# Patient Record
Sex: FEMALE | Race: BLACK OR AFRICAN AMERICAN | NOT HISPANIC OR LATINO | Employment: UNEMPLOYED | URBAN - METROPOLITAN AREA
[De-identification: names, ages, dates, MRNs, and addresses within clinical notes are randomized per-mention and may not be internally consistent; named-entity substitution may affect disease eponyms.]

---

## 2018-08-13 ENCOUNTER — OFFICE VISIT (OUTPATIENT)
Dept: URGENT CARE | Facility: CLINIC | Age: 58
End: 2018-08-13
Payer: MEDICAID

## 2018-08-13 DIAGNOSIS — Z76.89 ESTABLISHING CARE WITH NEW DOCTOR, ENCOUNTER FOR: ICD-10-CM

## 2018-08-13 DIAGNOSIS — J32.4 CHRONIC PANSINUSITIS: Primary | ICD-10-CM

## 2018-08-13 PROCEDURE — 99203 OFFICE O/P NEW LOW 30 MIN: CPT | Mod: S$GLB,,, | Performed by: NURSE PRACTITIONER

## 2018-08-13 RX ORDER — AMOXICILLIN AND CLAVULANATE POTASSIUM 875; 125 MG/1; MG/1
1 TABLET, FILM COATED ORAL EVERY 12 HOURS
Qty: 20 TABLET | Refills: 0 | Status: SHIPPED | OUTPATIENT
Start: 2018-08-13 | End: 2018-08-13

## 2018-08-13 RX ORDER — SILDENAFIL CITRATE 20 MG/1
20 TABLET ORAL 3 TIMES DAILY
COMMUNITY

## 2018-08-13 RX ORDER — LOSARTAN POTASSIUM 25 MG/1
25 TABLET ORAL DAILY
COMMUNITY

## 2018-08-13 RX ORDER — METOPROLOL TARTRATE 25 MG/1
25 TABLET, FILM COATED ORAL 2 TIMES DAILY
COMMUNITY

## 2018-08-13 RX ORDER — FUROSEMIDE 40 MG/1
40 TABLET ORAL 2 TIMES DAILY
COMMUNITY

## 2018-08-13 RX ORDER — AMOXICILLIN AND CLAVULANATE POTASSIUM 875; 125 MG/1; MG/1
1 TABLET, FILM COATED ORAL EVERY 12 HOURS
Qty: 20 TABLET | Refills: 0 | Status: SHIPPED | OUTPATIENT
Start: 2018-08-13 | End: 2018-08-23

## 2018-08-14 NOTE — PROGRESS NOTES
Subjective:       Patient ID: Ashley Romo is a 58 y.o. female.    Vitals:  vitals were not taken for this visit.     Chief Complaint: Headache    Chronic sinus infections. No relief with Claritin and Flonase.      Headache    This is a recurrent problem. The current episode started 1 to 4 weeks ago (approx. week and a half ago). The problem occurs constantly. The problem has been gradually worsening. The pain is located in the parietal and frontal region. The pain radiates to the face, left neck and right neck. The pain quality is similar to prior headaches. The quality of the pain is described as pulsating, throbbing and squeezing. The pain is at a severity of 10/10. The pain is severe. Associated symptoms include sinus pressure. Pertinent negatives include no abdominal pain, coughing, ear pain, eye redness, fever, nausea or sore throat. Nothing aggravates the symptoms. She has tried acetaminophen for the symptoms. The treatment provided no relief. Her past medical history is significant for cancer and hypertension. There is no history of pseudotumor cerebri or recent head traumas.     Review of Systems   Constitution: Negative for chills, fever and malaise/fatigue.   HENT: Positive for congestion and sinus pressure. Negative for ear pain, hoarse voice and sore throat.    Eyes: Negative for discharge and redness.   Cardiovascular: Negative for chest pain, dyspnea on exertion and leg swelling.   Respiratory: Negative for cough, shortness of breath, sputum production and wheezing.    Musculoskeletal: Negative for myalgias.   Gastrointestinal: Negative for abdominal pain and nausea.   Neurological: Positive for headaches.   All other systems reviewed and are negative.      Objective:      Physical Exam   Constitutional: She is oriented to person, place, and time. She appears well-developed and well-nourished. She is cooperative.  Non-toxic appearance. She does not appear ill. No distress.   HENT:   Head:  Normocephalic and atraumatic.   Right Ear: Hearing, tympanic membrane, external ear and ear canal normal.   Left Ear: Hearing, tympanic membrane, external ear and ear canal normal.   Nose: Mucosal edema present. No rhinorrhea or nasal deformity. No epistaxis. Right sinus exhibits maxillary sinus tenderness and frontal sinus tenderness. Left sinus exhibits maxillary sinus tenderness and frontal sinus tenderness.   Mouth/Throat: Uvula is midline, oropharynx is clear and moist and mucous membranes are normal. No trismus in the jaw. Normal dentition. No uvula swelling. No posterior oropharyngeal erythema.   Eyes: Conjunctivae and lids are normal. Right eye exhibits no discharge. Left eye exhibits no discharge. No scleral icterus.   Sclera clear bilat   Neck: Trachea normal, normal range of motion, full passive range of motion without pain and phonation normal. Neck supple.   Cardiovascular: Normal rate, regular rhythm, normal heart sounds, intact distal pulses and normal pulses.   Pulmonary/Chest: Effort normal and breath sounds normal. No respiratory distress.   Abdominal: Soft. Normal appearance and bowel sounds are normal. She exhibits no distension, no pulsatile midline mass and no mass. There is no tenderness.   Musculoskeletal: Normal range of motion. She exhibits no edema or deformity.   Lymphadenopathy:        Head (right side): No submental, no submandibular, no tonsillar, no preauricular, no posterior auricular and no occipital adenopathy present.        Head (left side): No submental, no submandibular, no tonsillar, no preauricular, no posterior auricular and no occipital adenopathy present.   Neurological: She is alert and oriented to person, place, and time. She exhibits normal muscle tone. Coordination normal.   Skin: Skin is warm, dry and intact. She is not diaphoretic. No pallor.   Psychiatric: She has a normal mood and affect. Her speech is normal and behavior is normal. Judgment and thought content  normal. Cognition and memory are normal.   Nursing note and vitals reviewed.      Assessment:       1. Chronic pansinusitis    2. Establishing care with new doctor, encounter for        Plan:         Chronic pansinusitis    Establishing care with new doctor, encounter for  -     Ambulatory referral to Internal Medicine    Other orders  -     Discontinue: amoxicillin-clavulanate 875-125mg (AUGMENTIN) 875-125 mg per tablet; Take 1 tablet by mouth every 12 (twelve) hours. for 10 days  Dispense: 20 tablet; Refill: 0  -     amoxicillin-clavulanate 875-125mg (AUGMENTIN) 875-125 mg per tablet; Take 1 tablet by mouth every 12 (twelve) hours. for 10 days  Dispense: 20 tablet; Refill: 0

## 2018-08-14 NOTE — PATIENT INSTRUCTIONS
Acute Bacterial Rhinosinusitis (ABRS)    Acute bacterial rhinosinusitis (ABRS) is an infection of your nasal cavity and sinuses. Its caused by bacteria. Acute means that youve had symptoms for less than 12 weeks.  Understanding your sinuses  The nasal cavity is the large air-filled space behind your nose. The sinuses are a group of spaces formed by the bones of your face. They connect with your nasal cavity. ABRS causes the tissue lining these spaces to become inflamed. Mucus may not drain normally. This leads to facial pain and other symptoms.  What causes ABRS?  ABRS most often follows an upper respiratory infection caused by a virus. Bacteria then infect the lining of your nasal cavity and sinuses. But you can also get ABRS if you have:  · Nasal allergies  · Long-term nasal swelling and congestion not caused by allergies  · Blockage in the nose  Symptoms of ABRS  The symptoms of ABRS may be different for each person, and can include:  · Nasal congestion  · Runny nose  · Fluid draining from the nose down the throat (postnasal drip)  · Headache  · Cough  · Pain in the sinuses  · Thick, colored fluid from the nose (mucus)  · Fever  Diagnosing ABRS  ABRS may be diagnosed if youve had an upper respiratory infection like a cold and cough for longer than 10 to 14 days. Your health care provider will ask about your symptoms and your medical history. The provider will check your vital signs, including your temperature. Youll have a physical exam. The health care provider will check your ears, nose, and throat. You likely wont need any tests. If ABRS comes back, you may have a culture or other tests.  Treatment for ABRS  Treatment may include:  · Antibiotic medicine. This is for symptoms that last for at least 10 to 14 days.  · Nasal corticosteroid medicine. Drops or spray used in the nose can lessen swelling and congestion.  · Over-the-counter pain medicine. This is to lessen sinus pain and pressure.  · Nasal  decongestant medicine. Spray or drops may help to lessen congestion. Do not use them for more than a few days.  · Salt wash (saline irrigation). This can help to loosen mucus.  Possible complications of ABRS  ABRS may come back or become long-term (chronic).  In rare cases, ABRS may cause complications such as:   · Inflamed tissue around the brain and spinal cord (meningitis)  · Inflamed tissue around the eyes (orbital cellulitis)  · Inflamed bones around the sinuses (osteitis)  These problems may need to be treated in a hospital with intravenous (IV) antibiotic medicine or surgery.  When to call the health care provider  Call your health care provider if you have any of the following:  · Symptoms that dont get better, or get worse  · Symptoms that dont get better after 3 to 5 days on antibiotics  · Trouble seeing  · Swelling around your eyes  · Confusion or trouble staying awake   Date Last Reviewed: 3/3/2015  © 5189-0684 [employer]. [address] [place] [address]. All rights reserved. This information is not intended as a substitute for professional medical care. Always follow your healthcare professional's instructions.      Please arrange follow up with your primary medical clinic as soon as possible. You must understand that you've received an Urgent Care treatment only and that you may be released before all of your medical problems are known or treated. You, the patient, will arrange for follow up as instructed. If your symptoms worsen or fail to improve you should go to the Emergency Room.

## 2018-08-16 ENCOUNTER — TELEPHONE (OUTPATIENT)
Dept: URGENT CARE | Facility: CLINIC | Age: 58
End: 2018-08-16

## 2022-09-26 ENCOUNTER — TELEPHONE (OUTPATIENT)
Dept: NEUROSURGERY | Facility: CLINIC | Age: 62
End: 2022-09-26
Payer: MEDICAID

## 2022-09-26 NOTE — TELEPHONE ENCOUNTER
Attempted to call all 3 numbers in pt's chart, all say the number is not in service. Unable to leave voicemail to offer pt appt from referral.

## 2022-10-25 ENCOUNTER — TELEPHONE (OUTPATIENT)
Dept: NEUROLOGY | Facility: CLINIC | Age: 62
End: 2022-10-25
Payer: MEDICAID

## 2022-10-25 NOTE — TELEPHONE ENCOUNTER
----- Message from Dia Mckenna sent at 10/21/2022  3:18 PM CDT -----  Regarding: Appt  Contact: pt [phone]  Pt was referred to Neuro by Dr. Jeison Caro, referral is not in Epic. Pt stated it was faxed over about 1-2 months ago. Please call

## 2022-10-27 ENCOUNTER — TELEPHONE (OUTPATIENT)
Dept: NEUROSURGERY | Facility: CLINIC | Age: 62
End: 2022-10-27
Payer: MEDICAID

## 2022-10-27 NOTE — TELEPHONE ENCOUNTER
CB and SW pt, who is having balance and memory issues but is mainly concerned about balance. She is not looking for surgery at this time. I explained it seems like she needs to be seen by neurology and sent a message to their department on her behalf. I then advised she have Dr. Jeison Caro, who referred her to neurosciences, resend the referral. Pt happy with conversation and did not have any further questions at that time.

## 2022-10-31 ENCOUNTER — TELEPHONE (OUTPATIENT)
Dept: NEUROSURGERY | Facility: CLINIC | Age: 62
End: 2022-10-31
Payer: MEDICAID

## 2022-10-31 NOTE — TELEPHONE ENCOUNTER
SW pt, explained that we would like her to see Dr. Mariscal but we will need her MRI from [facility] to have a productive visit. Pt will work on getting images through her PCP and I will try to get them pushed thorough [facility]. Scheduled pt for appt with Dr. Mariscal 11/22/22. Pt happy with date and time, she did not have any further questions. Letter mailed.

## 2022-11-02 ENCOUNTER — TELEPHONE (OUTPATIENT)
Dept: NEUROLOGY | Facility: CLINIC | Age: 62
End: 2022-11-02
Payer: MEDICAID

## 2022-11-02 NOTE — TELEPHONE ENCOUNTER
----- Message from Yuni Maciel MA sent at 10/27/2022 11:03 AM CDT -----  Good Morning,    This patient had a referral from Dr. Jeison Caro that was sent to be scanned into pt's chart about 1 month ago. Pt has a history of meningioma but is not a surgical candidate at this time. She does have balance and memory issues from surgery in 2019 and would like to see neurology. Could you assist in scheduling this patient?    Thank you,  Giuliana

## 2022-11-18 ENCOUNTER — TELEPHONE (OUTPATIENT)
Dept: NEUROSURGERY | Facility: CLINIC | Age: 62
End: 2022-11-18
Payer: MEDICAID

## 2022-11-18 NOTE — TELEPHONE ENCOUNTER
SW pt, explained that unfortunately, Dr. Mariscal will need to be in emergency surgery on Tuesday 11/22/22. Therefore, he cannot be in clinic during her appointment time. Pt rescheduled to 11/29/22 at 11:00 am. I also notified patient that we received her MRI from outside facility. Pt happy with date and time, no further questions at this time. Letter mailed.

## 2022-11-29 ENCOUNTER — OFFICE VISIT (OUTPATIENT)
Dept: NEUROSURGERY | Facility: CLINIC | Age: 62
End: 2022-11-29
Payer: MEDICAID

## 2022-11-29 VITALS — DIASTOLIC BLOOD PRESSURE: 101 MMHG | HEART RATE: 72 BPM | SYSTOLIC BLOOD PRESSURE: 240 MMHG

## 2022-11-29 DIAGNOSIS — D32.0 MENINGIOMA, RECURRENT OF BRAIN: Primary | ICD-10-CM

## 2022-11-29 DIAGNOSIS — R27.0 ATAXIA: ICD-10-CM

## 2022-11-29 DIAGNOSIS — Z98.890 H/O CRANIOTOMY: ICD-10-CM

## 2022-11-29 DIAGNOSIS — G93.89 ENCEPHALOMALACIA: ICD-10-CM

## 2022-11-29 PROCEDURE — 1159F PR MEDICATION LIST DOCUMENTED IN MEDICAL RECORD: ICD-10-PCS | Mod: CPTII,,, | Performed by: NEUROLOGICAL SURGERY

## 2022-11-29 PROCEDURE — 4010F PR ACE/ARB THEARPY RXD/TAKEN: ICD-10-PCS | Mod: CPTII,,, | Performed by: NEUROLOGICAL SURGERY

## 2022-11-29 PROCEDURE — 99205 OFFICE O/P NEW HI 60 MIN: CPT | Mod: S$PBB,,, | Performed by: NEUROLOGICAL SURGERY

## 2022-11-29 PROCEDURE — 99999 PR PBB SHADOW E&M-EST. PATIENT-LVL IV: ICD-10-PCS | Mod: PBBFAC,,, | Performed by: NEUROLOGICAL SURGERY

## 2022-11-29 PROCEDURE — 3080F PR MOST RECENT DIASTOLIC BLOOD PRESSURE >= 90 MM HG: ICD-10-PCS | Mod: CPTII,,, | Performed by: NEUROLOGICAL SURGERY

## 2022-11-29 PROCEDURE — 99205 PR OFFICE/OUTPT VISIT, NEW, LEVL V, 60-74 MIN: ICD-10-PCS | Mod: S$PBB,,, | Performed by: NEUROLOGICAL SURGERY

## 2022-11-29 PROCEDURE — 1159F MED LIST DOCD IN RCRD: CPT | Mod: CPTII,,, | Performed by: NEUROLOGICAL SURGERY

## 2022-11-29 PROCEDURE — 3080F DIAST BP >= 90 MM HG: CPT | Mod: CPTII,,, | Performed by: NEUROLOGICAL SURGERY

## 2022-11-29 PROCEDURE — 3077F SYST BP >= 140 MM HG: CPT | Mod: CPTII,,, | Performed by: NEUROLOGICAL SURGERY

## 2022-11-29 PROCEDURE — 99214 OFFICE O/P EST MOD 30 MIN: CPT | Mod: PBBFAC | Performed by: NEUROLOGICAL SURGERY

## 2022-11-29 PROCEDURE — 4010F ACE/ARB THERAPY RXD/TAKEN: CPT | Mod: CPTII,,, | Performed by: NEUROLOGICAL SURGERY

## 2022-11-29 PROCEDURE — 1160F RVW MEDS BY RX/DR IN RCRD: CPT | Mod: CPTII,,, | Performed by: NEUROLOGICAL SURGERY

## 2022-11-29 PROCEDURE — 1160F PR REVIEW ALL MEDS BY PRESCRIBER/CLIN PHARMACIST DOCUMENTED: ICD-10-PCS | Mod: CPTII,,, | Performed by: NEUROLOGICAL SURGERY

## 2022-11-29 PROCEDURE — 99999 PR PBB SHADOW E&M-EST. PATIENT-LVL IV: CPT | Mod: PBBFAC,,, | Performed by: NEUROLOGICAL SURGERY

## 2022-11-29 PROCEDURE — 3077F PR MOST RECENT SYSTOLIC BLOOD PRESSURE >= 140 MM HG: ICD-10-PCS | Mod: CPTII,,, | Performed by: NEUROLOGICAL SURGERY

## 2022-11-29 RX ORDER — EMPAGLIFLOZIN 25 MG/1
25 TABLET, FILM COATED ORAL
COMMUNITY
Start: 2022-11-03

## 2022-11-29 RX ORDER — SITAGLIPTIN 100 MG/1
100 TABLET, FILM COATED ORAL
COMMUNITY
Start: 2022-11-10

## 2022-11-29 RX ORDER — UBIDECARENONE 75 MG
500 CAPSULE ORAL
COMMUNITY

## 2022-11-29 RX ORDER — CHOLECALCIFEROL (VITAMIN D3) 25 MCG
1000 TABLET ORAL
COMMUNITY

## 2022-11-29 RX ORDER — METFORMIN HYDROCHLORIDE 500 MG/1
500 TABLET, EXTENDED RELEASE ORAL 2 TIMES DAILY
COMMUNITY
Start: 2022-08-20

## 2022-11-29 RX ORDER — BUSPIRONE HYDROCHLORIDE 7.5 MG/1
7.5 TABLET ORAL 2 TIMES DAILY PRN
COMMUNITY
Start: 2022-10-14

## 2022-11-29 RX ORDER — CARVEDILOL 25 MG/1
25 TABLET ORAL 2 TIMES DAILY
COMMUNITY
Start: 2022-11-03

## 2022-11-29 RX ORDER — ASCORBIC ACID 500 MG
500 TABLET ORAL 2 TIMES DAILY
COMMUNITY

## 2022-11-29 RX ORDER — CALCIUM CITRATE/VITAMIN D3 200MG-6.25
TABLET ORAL
COMMUNITY
Start: 2022-09-15

## 2022-11-29 NOTE — PROGRESS NOTES
CHIEF COMPLAINT:  Prior left cerebellar meningioma resection at outside hospital in 2019; follow up for ataxia    HPI:  Ashley Romo is a 62F who underwent left retrosigmoid approach for resection of L CPA meningioma resection (85%) by Dr Arturo Whitaker at [facility] in 2019 and has been followed by Dr Leblanc. Pt underwent one session of radiation 4 months after surgery reportedly due to regrowth.  She is trying to get a motorized wheel chair because she us unable to walk and relies on wheel chair.  Dr Leblanc dx her with ataxia.  She has weakness on left side and has had several falls from her wheel chair.  She states that insurance will not cover the power wheel chair because they claim that ataxia will improve.    She has done PT and balance specific PT to no avail.    Review of patient's allergies indicates:   Allergen Reactions    Codeine Other (See Comments)     Abdominal pain^elevates blood pressure  Stabbing abdominal pain      Eggshell membrane Rash     Rash     Ibuprofen Other (See Comments)     Raises blood pressure.    Naproxen sodium Other (See Comments)     ^elevates blood pressure    Metformin Diarrhea     Regular release metformin    Milk containing products Other (See Comments)     aggravates asthma     Nsaids (non-steroidal anti-inflammatory drug) Other (See Comments)     Elevated BP       Past Medical History:   Diagnosis Date    Arthritis     Asthma     Cancer of kidney 2016    Diabetes mellitus, type 2     Hypertension     Overdose of illicit drug     PAH (pulmonary artery hypertension) 2013     Past Surgical History:   Procedure Laterality Date    CHOLECYSTECTOMY  1986    HYSTERECTOMY  2011    NEPHRECTOMY Left 11/2017    PARTIAL HYSTERECTOMY  12/2006    SINUS SURGERY  1981     History reviewed. No pertinent family history.  Social History     Tobacco Use    Smoking status: Former    Smokeless tobacco: Never   Substance Use Topics    Alcohol use: Yes     Comment: occationally        Review of  Systems   Constitutional:  Negative for chills, fever and weight loss.   HENT:  Negative for hearing loss and tinnitus.    Eyes:  Negative for blurred vision and photophobia.   Respiratory:  Negative for cough and wheezing.    Cardiovascular:  Negative for chest pain and palpitations.   Gastrointestinal:  Negative for heartburn, nausea and vomiting.   Musculoskeletal:  Negative for back pain and myalgias.   Skin:  Negative for itching and rash.   Neurological:  Positive for dizziness and weakness. Negative for tingling, tremors, sensory change, speech change, focal weakness, seizures, loss of consciousness and headaches.   Psychiatric/Behavioral:  Negative for depression and hallucinations.      OBJECTIVE:   Vital Signs:  Pulse: 72 (11/29/22 1116)  BP: (!) 240/101 (11/29/22 1116)    Physical Exam:  Constitutional: Patient sitting comfortably in wheel chair. Obese.  Skin: Exposed areas are intact without abnormal markings, rashes or other lesions.  HEENT: Normocephalic. Normal conjunctivae.  Cardiovascular: Normal rate and regular rhythm.  Respiratory: Chest wall rises and falls symmetrically, without signs of respiratory distress.  Abdomen: Soft and non-tender.  Extremities: Warm and without edema. Calves supple, non-tender.  Psych/Behavior: Normal affect.    Neurological:    Mental status: Alert and oriented. Conversational and appropriate.       Cranial Nerves: VFF to confrontation. PERRL. EOMI without nystagmus. Facial STLT normal and symmetric. Strong, symmetric muscles of mastication. Facial strength full and symmetric. Hearing equal bilaterally to finger rub. Palate and uvula rise and fall normally in midline. Shoulder shrug 5/5 strength. Tongue midline.     Motor:    Upper:  Deltoids Triceps Biceps WE WF     R 5/5 5/5 5/5 5/5 5/5 5/5    L 5/5 5/5 5/5 5/5 5/5 5/5      Lower:  HF KE KF DF PF EHL    R 5/5 5/5 5/5 5/5 5/5 5/5    L 5/5 5/5 5/5 5/5 5/5 5/5     Sensory: Intact sensation to light touch in all  extremities. Romberg negative.    Reflexes:          DTR: 2+ symmetrically throughout.     Rodriguez's: Negative.     Babinski's: Negative.     Clonus: Negative.    Cerebellar: Significant dysmetria LUE and LLE (significant ataxia)    Gait deferred due to significant long standing imbalance    Diagnostic Results:  All imaging was independently reviewed by me.    MRI brain, dated 11/16/2022:  1. Prior retrosig craniotomy for resection of CPA meningioma with left cerebellar encephalomalacia  2. Small residual       ASSESSMENT/PLAN:     Problem List Items Addressed This Visit          Neuro    Ataxia    Encephalomalacia    H/O craniotomy       Oncology    Meningioma, recurrent of brain - Primary    Relevant Orders    MRI Brain W WO Contrast    Ambulatory referral/consult to ENT    Creatinine, serum       H/o left retrosigmoid craniotomy for CPA meningioma followed by one session of radiation therapy (presumably SRS).   She has significant L sided ataxia which correlates with  left surgery site and subsequent cerebellar encephalomalacia.  She is fully dependent on wheel chair and has had frequent falls due to ataxia and imbalance.  She is an ideal candidate for a motorized wheel chair to improve her mobility and quality of life.  She also should undergo annual MRI surveillance of the residual mass.    - RTC in 1 yr with [facility]  - Agree with power wheel chair    The patient understands and agrees with the plan of care. All questions were answered.    Time spent on this encounter: 60 minutes. This includes face-to-face time and non-face to face time preparing to see the patient (eg, review of tests), obtaining and/or reviewing separately obtained history, documenting clinical information in the electronic or other health record, independently interpreting results and communicating results to the patient/family/caregiver, or care coordinator.          .

## 2022-12-01 ENCOUNTER — TELEPHONE (OUTPATIENT)
Dept: NEUROSURGERY | Facility: CLINIC | Age: 62
End: 2022-12-01
Payer: MEDICAID

## 2022-12-01 NOTE — TELEPHONE ENCOUNTER
BRISEIDA and SW pt, who asked that Dr. Mariscal write a note or add to her clinic note that her stroke was due to effects from surgery and not from high cholesterol. She states the reason she would like this note is to give to her PCP because she does not want to start cholesterol medication. I will discuss with Dr. Mariscal and get back to her.     ----- Message -----  From: Joyce Villalta MA  Sent: 11/30/2022   2:26 PM CST  To: Yuni Maciel MA, Davey Maravilla Staff    Pt would like to speak with nurse/ma regarding everything she was told during visit with Dr. Mariscal. Pt can be reached at [phone].

## 2022-12-02 ENCOUNTER — TELEPHONE (OUTPATIENT)
Dept: NEUROSURGERY | Facility: CLINIC | Age: 62
End: 2022-12-02
Payer: MEDICAID

## 2022-12-02 PROBLEM — R27.0 ATAXIA: Status: ACTIVE | Noted: 2022-12-02

## 2022-12-02 PROBLEM — D32.0: Status: ACTIVE | Noted: 2022-12-02

## 2022-12-02 PROBLEM — G93.89 ENCEPHALOMALACIA: Status: ACTIVE | Noted: 2022-12-02

## 2022-12-02 PROBLEM — Z98.890 H/O CRANIOTOMY: Status: ACTIVE | Noted: 2022-12-02

## 2022-12-02 NOTE — TELEPHONE ENCOUNTER
"CB and SW pt, explained that Dr. Mariscal cannot definitively state if or when she had a stroke as he was not the surgeon who performed her surgery. Per Dr. Mariscal, I explained that she has some dead cerebellar tissue that is from retraction during surgery and he tried to explain it using the concept of stroke and lack of blood flow. Pt understood, stating she needs her chart note to say "permanent ataxia" in order to get her power chair approved. I explained that Dr. Mariscal has the correct diagnosis in and we are working on getting her information submitted for the chair. Pt happy with conversation and did not have any further questions at this time.       "

## 2022-12-09 ENCOUNTER — TELEPHONE (OUTPATIENT)
Dept: NEUROSURGERY | Facility: CLINIC | Age: 62
End: 2022-12-09
Payer: MEDICAID

## 2022-12-09 DIAGNOSIS — R27.0 ATAXIA: ICD-10-CM

## 2022-12-09 DIAGNOSIS — Z98.890 H/O CRANIOTOMY: Primary | ICD-10-CM

## 2022-12-09 DIAGNOSIS — D32.0 MENINGIOMA, RECURRENT OF BRAIN: ICD-10-CM

## 2022-12-09 DIAGNOSIS — G93.89 ENCEPHALOMALACIA: ICD-10-CM

## 2022-12-21 ENCOUNTER — OFFICE VISIT (OUTPATIENT)
Dept: OTOLARYNGOLOGY | Facility: CLINIC | Age: 62
End: 2022-12-21
Payer: MEDICAID

## 2022-12-21 ENCOUNTER — CLINICAL SUPPORT (OUTPATIENT)
Dept: AUDIOLOGY | Facility: CLINIC | Age: 62
End: 2022-12-21
Payer: MEDICAID

## 2022-12-21 VITALS — SYSTOLIC BLOOD PRESSURE: 143 MMHG | DIASTOLIC BLOOD PRESSURE: 102 MMHG

## 2022-12-21 DIAGNOSIS — H90.3 SENSORINEURAL HEARING LOSS (SNHL), BILATERAL: ICD-10-CM

## 2022-12-21 DIAGNOSIS — H93.13 TINNITUS, BILATERAL: Primary | ICD-10-CM

## 2022-12-21 DIAGNOSIS — H90.3 SENSORINEURAL HEARING LOSS, BILATERAL: ICD-10-CM

## 2022-12-21 DIAGNOSIS — H90.3 ASYMMETRIC SNHL (SENSORINEURAL HEARING LOSS): ICD-10-CM

## 2022-12-21 DIAGNOSIS — D32.0 MENINGIOMA, RECURRENT OF BRAIN: ICD-10-CM

## 2022-12-21 PROCEDURE — 3077F SYST BP >= 140 MM HG: CPT | Mod: CPTII,,, | Performed by: NURSE PRACTITIONER

## 2022-12-21 PROCEDURE — 3077F PR MOST RECENT SYSTOLIC BLOOD PRESSURE >= 140 MM HG: ICD-10-PCS | Mod: CPTII,,, | Performed by: NURSE PRACTITIONER

## 2022-12-21 PROCEDURE — 99999 PR PBB SHADOW E&M-EST. PATIENT-LVL III: CPT | Mod: PBBFAC,,, | Performed by: NURSE PRACTITIONER

## 2022-12-21 PROCEDURE — 99213 OFFICE O/P EST LOW 20 MIN: CPT | Mod: PBBFAC,27 | Performed by: NURSE PRACTITIONER

## 2022-12-21 PROCEDURE — 3080F PR MOST RECENT DIASTOLIC BLOOD PRESSURE >= 90 MM HG: ICD-10-PCS | Mod: CPTII,,, | Performed by: NURSE PRACTITIONER

## 2022-12-21 PROCEDURE — 92557 COMPREHENSIVE HEARING TEST: CPT | Mod: PBBFAC

## 2022-12-21 PROCEDURE — 1159F PR MEDICATION LIST DOCUMENTED IN MEDICAL RECORD: ICD-10-PCS | Mod: CPTII,,, | Performed by: NURSE PRACTITIONER

## 2022-12-21 PROCEDURE — 1159F MED LIST DOCD IN RCRD: CPT | Mod: CPTII,,, | Performed by: NURSE PRACTITIONER

## 2022-12-21 PROCEDURE — 99999 PR PBB SHADOW E&M-EST. PATIENT-LVL III: ICD-10-PCS | Mod: PBBFAC,,, | Performed by: NURSE PRACTITIONER

## 2022-12-21 PROCEDURE — 99204 OFFICE O/P NEW MOD 45 MIN: CPT | Mod: S$PBB,,, | Performed by: NURSE PRACTITIONER

## 2022-12-21 PROCEDURE — 92567 TYMPANOMETRY: CPT | Mod: PBBFAC

## 2022-12-21 PROCEDURE — 3080F DIAST BP >= 90 MM HG: CPT | Mod: CPTII,,, | Performed by: NURSE PRACTITIONER

## 2022-12-21 PROCEDURE — 99999 PR PBB SHADOW E&M-EST. PATIENT-LVL I: CPT | Mod: PBBFAC,,,

## 2022-12-21 PROCEDURE — 99211 OFF/OP EST MAY X REQ PHY/QHP: CPT | Mod: PBBFAC

## 2022-12-21 PROCEDURE — 99999 PR PBB SHADOW E&M-EST. PATIENT-LVL I: ICD-10-PCS | Mod: PBBFAC,,,

## 2022-12-21 PROCEDURE — 99204 PR OFFICE/OUTPT VISIT, NEW, LEVL IV, 45-59 MIN: ICD-10-PCS | Mod: S$PBB,,, | Performed by: NURSE PRACTITIONER

## 2022-12-21 NOTE — PROGRESS NOTES
Ashley Romo was seen today in the clinic for an audiologic evaluation.  Patient's main complaint was constant tinnitus bilaterally.  Mrs. Romo reported that her tinnitus began following surgery to remove a brain tumor. She also noted decreased hearing that is worse in the left ear.     Tympanometry revealed Type Ad in the right ear and Type A in the left ear.     Audiogram results revealed normal sloping to mild sensorineural hearing loss in the right ear and mild sloping to moderate sensorineural hearing loss in the left ear.  An asymmetry is noted from 6252-1091 Hz with left ear worse than right ear.    Speech reception thresholds were noted at 20 dB in the right ear and 40 dB in the left ear.    Speech discrimination scores were 100% in the right ear and 60% in the left ear.    Results were reviewed with Mrs. Romo and hearing aids were recommended. She was encouraged to contact her insurance company to inquire about hearing aid benefits and contact this clinic if she would like to schedule a consultation.    Recommendations:  Otologic evaluation  Annual audiogram  Hearing protection when in noise  Hearing aid consultation pending medical clearance

## 2022-12-21 NOTE — PROGRESS NOTES
"Subjective:   Ashley Romo is a 62 y.o. female who was self-referred for tinnitus.    Ashley Romo presents to clinic for the evaluation of bilateral tinnitus.  She reports the right ear has had tinnitus since 2005.  The left ear started ringing in 2019 after undergoing a craniotomy for a meningioma.  She describes the sound as a "tea kettle hissing".  She denies any otalgia, otorrhea, ear fullness/pressure, or vertigo.  She denies hearing loss. She denies regular ASA/ NSAID, but she does take Lasix and Sildafinil- known ototoxic medications.  She drinks minimal caffeine.  No tobacco products, social drinker.     There is not a family history of hearing loss at a young age.  There is not a prior history of ear surgery.  There is not a prior history of ear infections.  There is not a history of ear trauma.  She denies a history of significant noise exposure.  She does not wear hearing aids currently.  She has not had a hearing test recently.      Past Medical History  She has a past medical history of Arthritis, Asthma, Cancer of kidney, Diabetes mellitus, type 2, Hypertension, Overdose of illicit drug, and PAH (pulmonary artery hypertension).    Past Surgical History  She has a past surgical history that includes Nephrectomy (Left, 11/2017); Partial hysterectomy (12/2006); Hysterectomy (2011); Sinus surgery (1981); and Cholecystectomy (1986).    Family History  Her family history is not on file.    Social History  She reports that she has quit smoking. She has never used smokeless tobacco. She reports current alcohol use.    Allergies  She is allergic to codeine, eggshell membrane, ibuprofen, naproxen sodium, metformin, milk containing products, and nsaids (non-steroidal anti-inflammatory drug).    Medications  She has a current medication list which includes the following prescription(s): ascorbic acid (vitamin c), buspirone, carvedilol, diphenhydramine-acetaminophen, furosemide, januvia, jardiance, losartan, " metformin, sildenafil, true metrix glucose test strip, vitamin d, cyanocobalamin, and metoprolol tartrate.  Review of Systems     Constitutional: Negative for chills and fever.      HENT: Positive for hearing loss and ringing in the ears.  Negative for ear discharge, ear infection and ear pain.      Respiratory:  Positive for cough and shortness of breath.      Cardiovascular:  Negative for chest pain and irregular heartbeat.     Neurological: Positive for dizziness. Negative for light-headedness, seizures and tremors.        Objective:   BP (!) 143/102      Constitutional:   She is oriented to person, place, and time. She appears well-developed and well-nourished. She appears alert. She is cooperative.  Non-toxic appearance. She does not have a sickly appearance. She does not appear ill. Normal speech.      Head:  Normocephalic and atraumatic. Not macrocephalic and not microcephalic. Head is without raccoon's eyes, without Morrison's sign, without abrasion, without laceration, without right periorbital erythema, without left periorbital erythema and without TMJ tenderness.     Ears:    Right Ear: No lacerations. No drainage, swelling or tenderness. No foreign bodies. No mastoid tenderness. Tympanic membrane is not injected, not scarred, not perforated, not erythematous, not retracted and not bulging. No middle ear effusion. No hemotympanum.   Left Ear: No lacerations. There is tenderness. No drainage or swelling. No foreign bodies. No mastoid tenderness. Tympanic membrane is not injected, not scarred, not perforated, not erythematous, not retracted and not bulging.  No middle ear effusion. No hemotympanum.   Ears:      Pulmonary/Chest:   Effort normal.     Psychiatric:   She has a normal mood and affect. Her speech is normal and behavior is normal.     Neurological:   She is alert and oriented to person, place, and time. Gait (wheelchair) abnormal.   Procedure  None    Data Reviewed  No results found for: WBC  No  results found for: EOSINOPHIL  No results found for: EOS  No results found for: PLT  No results found for: GLU  No results found for: IGE      Audiogram      I independently reviewed the tracings of the complete audiometric evaluation performed today.  I reviewed the audiogram with the patient as well.  Pertinent findings include normal sloping to mild sensorineural hearing loss in the right ear and mild sloping to moderate sensorineural hearing loss in the left ear.  Assessment:     1. Tinnitus, bilateral    2. Sensorineural hearing loss (SNHL), bilateral    3. Asymmetric SNHL (sensorineural hearing loss)    4. Meningioma, recurrent of brain      Plan:   Tinnitus, bilateral  Discussed the etiology of tinnitus and management strategies including the use of background sound enrichment.  Further instructed that avoidance of caffeine, alcohol, tobacco, and stress can be of benefit.  While currently on known medications linked to tinnitus I do no at this time recommended adjusting/changing those medications.  Strongly suspect the left sided tinnitus is related to history of meningioma.  Trial of hearing amplification encouraged.     Sensorineural hearing loss (SNHL), bilateral  Audiometric testing interpretation consistent with sensorineural hearing loss.  Discussed the etiology of SNHL.  Medically cleared for hearing amplification, and will follow-up with Audiology if interested.  Hearing conservation in noisy environments.      Asymmetric SNHL (sensorineural hearing loss)/ Meningioma, recurrent of brain  Reviewed most recent Brain MRI with Dr. Forde.  Hearing loss and tinnitus most likely due meningioma, no evidence of vestibular neuroma.  Neurosurgery to follow with annual imaging to monitor.       Return to clinic every year for audiometric testing.

## 2023-01-23 ENCOUNTER — TELEPHONE (OUTPATIENT)
Dept: NEUROSURGERY | Facility: CLINIC | Age: 63
End: 2023-01-23
Payer: MEDICAID

## 2023-01-23 NOTE — TELEPHONE ENCOUNTER
CB and SW pt, explained that her paperwork was sent to [facility] 12/9/23. Explained that I am unsure if they take her insurance, but gave their phone number for her to confirm. I also offered to send her paperwork to any facility she would prefer. Pt will call back to refax info if needed. She was happy with call and did not have any further questions at this time.     ----- Message -----  From: China Sow  Sent: 1/20/2023   3:23 PM CST  To: Davey Maravilla Staff  Subject: Pt Adv                                           Pt calling in regards to power chair Dr. Mariscal informed pt he would write prescription for. Pt stated no one reached out. Please call and adv @[phone]

## 2023-01-25 ENCOUNTER — TELEPHONE (OUTPATIENT)
Dept: NEUROSURGERY | Facility: CLINIC | Age: 63
End: 2023-01-25
Payer: MEDICAID

## 2023-01-25 NOTE — TELEPHONE ENCOUNTER
SILVERM for pt, explained that I did some research for her and I did find a company that carries power wheel chairs for Medicaid patients. I sent her order and information to this company. I gave their phone number at [phone]. Advised pt call back with any questions or concerns.     ----- Message -----  From: Shaunna Pizano  Sent: 1/23/2023   2:15 PM CST  To: Davey Maravilla Staff  Subject: Pt Advice                                        Pt states she called [facility] and was told they don't handle power chairs.  Please call.

## 2023-01-26 ENCOUNTER — TELEPHONE (OUTPATIENT)
Dept: NEUROSURGERY | Facility: CLINIC | Age: 63
End: 2023-01-26
Payer: MEDICAID

## 2023-01-26 NOTE — TELEPHONE ENCOUNTER
CB and LM for Maren with [facility], specified that the orders are for a power wheel chair. Asked she call back with any further questions.     ----- Message -----  From: Ally Delgado  Sent: 1/25/2023   3:42 PM CST  To: Davey Diallo    Maren from [facility] is calling in regards to Pt wheelchair orders. She need to specify some info on the orders Standard or Power wheel chair? Please adv. Dallin is requesting a callback from [facility].      [phone] (Maren)      Confirmed contact below:   Contact Name:Ashley Romo  Phone Number: [phone]

## 2023-02-15 ENCOUNTER — TELEPHONE (OUTPATIENT)
Dept: NEUROSURGERY | Facility: CLINIC | Age: 63
End: 2023-02-15
Payer: MEDICAID

## 2023-02-15 NOTE — TELEPHONE ENCOUNTER
CB and SW pt, provided phone number for [employer] company where her information was sent. Pt asked when she was due for appt with Dr. Mariscal, she is not due until 11/2023. Pt will wait to make appt until closer date. She was happy with call and did not have any further questions at this time.     ----- Message from Shaunna Pizano sent at 2/15/2023  9:20 AM CST -----  Regarding: Pt Advice  Contact: [phone]  Pt is calling inquiring which [employer] her rx was sent to for her power chair, because there's more than one.  Please call.

## 2023-03-01 ENCOUNTER — TELEPHONE (OUTPATIENT)
Dept: NEUROSURGERY | Facility: CLINIC | Age: 63
End: 2023-03-01
Payer: MEDICAID

## 2023-03-01 DIAGNOSIS — Z98.890 H/O CRANIOTOMY: ICD-10-CM

## 2023-03-01 DIAGNOSIS — D32.0 MENINGIOMA, RECURRENT OF BRAIN: Primary | ICD-10-CM

## 2023-03-01 NOTE — TELEPHONE ENCOUNTER
----- Message from Y[facility] Maciel MA sent at 2/28/2023  4:08 PM CST -----  Contact: [phone]    ----- Message -----  From: Joyce Villalta MA  Sent: 2/28/2023  12:23 PM CST  To: Davey Maravilla Staff    Pt states the power chair prescription was written wrong and [facility]. The pt would like a callback at [phone].

## 2023-03-03 ENCOUNTER — TELEPHONE (OUTPATIENT)
Dept: NEUROSURGERY | Facility: CLINIC | Age: 63
End: 2023-03-03
Payer: MEDICAID

## 2023-03-03 NOTE — TELEPHONE ENCOUNTER
CB and SW pt, who explained the order for the power wheelchair was not correct since it was an order for a regular wheelchair and a power wheelchair according to [employer]. New order placed and signed by Dr. Mariscal. If this order does not go through patient will need to go to PCP for order. New order faxed to [employer].

## 2023-05-09 ENCOUNTER — PATIENT MESSAGE (OUTPATIENT)
Dept: RESEARCH | Facility: HOSPITAL | Age: 63
End: 2023-05-09
Payer: MEDICAID

## 2024-05-23 ENCOUNTER — HOSPITAL ENCOUNTER (OUTPATIENT)
Dept: RADIOLOGY | Facility: HOSPITAL | Age: 64
Discharge: HOME | End: 2024-05-23
Attending: PHYSICIAN ASSISTANT
Payer: MEDICARE

## 2024-05-23 ENCOUNTER — OFFICE VISIT (OUTPATIENT)
Dept: ORTHOPEDICS | Facility: CLINIC | Age: 64
End: 2024-05-23
Payer: MEDICARE

## 2024-05-23 DIAGNOSIS — M79.642 BILATERAL HAND PAIN: ICD-10-CM

## 2024-05-23 DIAGNOSIS — M25.562 ACUTE PAIN OF LEFT KNEE: ICD-10-CM

## 2024-05-23 DIAGNOSIS — S93.492A SPRAIN OF OTHER LIGAMENT OF LEFT ANKLE, INITIAL ENCOUNTER: ICD-10-CM

## 2024-05-23 DIAGNOSIS — M79.641 BILATERAL HAND PAIN: Primary | ICD-10-CM

## 2024-05-23 DIAGNOSIS — M79.641 BILATERAL HAND PAIN: ICD-10-CM

## 2024-05-23 DIAGNOSIS — M79.642 BILATERAL HAND PAIN: Primary | ICD-10-CM

## 2024-05-23 PROCEDURE — 1159F MED LIST DOCD IN RCRD: CPT | Mod: CPTII,S$GLB,, | Performed by: PHYSICIAN ASSISTANT

## 2024-05-23 PROCEDURE — 73130 X-RAY EXAM OF HAND: CPT | Mod: 26,50,, | Performed by: RADIOLOGY

## 2024-05-23 PROCEDURE — 3288F FALL RISK ASSESSMENT DOCD: CPT | Mod: CPTII,S$GLB,, | Performed by: PHYSICIAN ASSISTANT

## 2024-05-23 PROCEDURE — 1101F PT FALLS ASSESS-DOCD LE1/YR: CPT | Mod: CPTII,S$GLB,, | Performed by: PHYSICIAN ASSISTANT

## 2024-05-23 PROCEDURE — 73130 X-RAY EXAM OF HAND: CPT | Mod: TC,50

## 2024-05-23 PROCEDURE — 73562 X-RAY EXAM OF KNEE 3: CPT | Mod: TC,LT

## 2024-05-23 PROCEDURE — 99999 PR PBB SHADOW E&M-EST. PATIENT-LVL III: CPT | Mod: PBBFAC,,, | Performed by: PHYSICIAN ASSISTANT

## 2024-05-23 PROCEDURE — 1160F RVW MEDS BY RX/DR IN RCRD: CPT | Mod: CPTII,S$GLB,, | Performed by: PHYSICIAN ASSISTANT

## 2024-05-23 PROCEDURE — 99204 OFFICE O/P NEW MOD 45 MIN: CPT | Mod: S$GLB,,, | Performed by: PHYSICIAN ASSISTANT

## 2024-05-23 PROCEDURE — 73562 X-RAY EXAM OF KNEE 3: CPT | Mod: 26,LT,, | Performed by: RADIOLOGY

## 2024-05-23 PROCEDURE — 4010F ACE/ARB THERAPY RXD/TAKEN: CPT | Mod: CPTII,S$GLB,, | Performed by: PHYSICIAN ASSISTANT

## 2024-05-23 PROCEDURE — 3044F HG A1C LEVEL LT 7.0%: CPT | Mod: CPTII,S$GLB,, | Performed by: PHYSICIAN ASSISTANT

## 2024-05-27 NOTE — PROGRESS NOTES
SUBJECTIVE:     Chief Complaint   Patient presents with    Left Knee - Pain    Left Hand - Pain    Right Hand - Pain       History of Present Illness:  Ashley Romo is a 64 y.o. year old female here with complaints of bilateral knee pain, left worse than right and hand pain.  She also injured her ankle.  She was seen in urgent care and had x-rays done- states they were all negative for fracture.  The injury occurred when attempting to get on a train.  She was not able to step up due to significant leg weakness at baseline.  She fell down and was holding on to pull herself up with her arms. She did not have knee pain prior to the fall.  She states her balance problems and weakness are due to history of brain tumor.  She can ambulate some with a walker. She also uses her hands to knit.  She has increased difficulty and weakness in her hands since the injury.    Review of patient's allergies indicates:   Allergen Reactions    Codeine Other (See Comments)     Abdominal pain^elevates blood pressure  Stabbing abdominal pain      Eggshell membrane Rash     Rash     Ibuprofen Other (See Comments)     Raises blood pressure.    Naproxen sodium Other (See Comments)     ^elevates blood pressure    Metformin Diarrhea     Regular release metformin    Milk containing products (dairy) Other (See Comments)     aggravates asthma     Nsaids (non-steroidal anti-inflammatory drug) Other (See Comments)     Elevated BP         Current Outpatient Medications   Medication Sig Dispense Refill    ascorbic acid, vitamin C, (VITAMIN C) 500 MG tablet Take 500 mg by mouth 2 (two) times daily.      busPIRone (BUSPAR) 7.5 MG tablet Take 7.5 mg by mouth 2 (two) times daily as needed.      carvediloL (COREG) 25 MG tablet Take 25 mg by mouth 2 (two) times daily.      cyanocobalamin 500 MCG tablet Take 500 mcg by mouth.      diphenhydrAMINE-acetaminophen (TYLENOL PM)  mg Tab Take by mouth.      furosemide (LASIX) 40 MG tablet Take 40 mg by  mouth 2 (two) times daily.      JANUVIA 100 mg Tab Take 100 mg by mouth.      JARDIANCE 25 mg tablet Take 25 mg by mouth.      losartan (COZAAR) 25 MG tablet Take 25 mg by mouth once daily.      metFORMIN (GLUCOPHAGE-XR) 500 MG ER 24hr tablet Take 500 mg by mouth 2 (two) times daily.      metoprolol tartrate (LOPRESSOR) 25 MG tablet Take 25 mg by mouth 2 (two) times daily.      sildenafil (REVATIO) 20 mg Tab Take 20 mg by mouth 3 (three) times daily.      TRUE METRIX GLUCOSE TEST STRIP Strp SMARTSIG:Via Meter Daily      vitamin D (VITAMIN D3) 1000 units Tab Take 1,000 Units by mouth.       No current facility-administered medications for this visit.       Past Medical History:   Diagnosis Date    Arthritis     Asthma     Cancer of kidney 2016    Diabetes mellitus, type 2     Hypertension     Overdose of illicit drug     PAH (pulmonary artery hypertension) 2013       Past Surgical History:   Procedure Laterality Date    CHOLECYSTECTOMY  1986    HYSTERECTOMY  2011    NEPHRECTOMY Left 11/2017    PARTIAL HYSTERECTOMY  12/2006    SINUS SURGERY  1981         Review of Systems:  ROS:  Constitutional: no fever or chills  Eyes: no visual changes  ENT: no nasal congestion or sore throat  Respiratory: no cough or shortness of breath  Musculoskeletal: no arthralgias or myalgias      OBJECTIVE:     PHYSICAL EXAM:        General: Well developed, well nourished, in no acute distress.  Neurological: Mood & affect are normal.  HEENT: NCAT, sclera nonicteric   Lungs: Respirations are equal and unlabored.   CV: 2+ bilateral upper and lower extremity pulses.   Skin: Intact throughout with no rashes, erythema, or lesions  Extremities: No LE edema, no erythema or warmth of the skin in either lower extremity.    left Knee Exam:  Knee Range of Motion: 0-130   Effusion: none  Condition of skin: intact  Location of tenderness: Medial joint line   Strength: 5 of 5 quadriceps strength and 5 of 5 hamstring strength  Stability:  stable to  testing  Varus /Valgus stress:  normal    Left ankle  Skin intact  No warmth or swelling  TTP along ATFL  FROM  No deformity  NVI    Bilateral hand  FROM in fingers  No deformity  No swelling  Skin intact  Sensation intact    IMAGING:    X-rays of the left knee, personally reviewed by me, demonstrate moderate DJD.  No fracture or dislocation.     X-rays of the bilateral hand, personally reviewed by me, demonstrate well maintained joint space.  No fracture or dislocation.       ASSESSMENT     1. Bilateral hand pain    2. Acute pain of left knee    3. Sprain of other ligament of left ankle, initial encounter      PLAN:     We discussed with the patient at length all the different treatment options available for the knee including NSAIDS, acetaminophen, rest, ice, knee strengthening exercise, steroid injections for temporary relief, Viscosupplementation, and knee arthroplasty.     - Advised to send copy of papers/xray reports from urgent care visit  - ankle brace provided  - Suspect she has exacerbated underlying OA in knee.  Will monitor for now- if worsening symptoms can consider CSI  - Bilateral hand pain- monitor for now  - Follow up if symptoms worsen or fail to improve

## 2024-07-08 ENCOUNTER — OFFICE VISIT (OUTPATIENT)
Dept: ORTHOPEDICS | Facility: CLINIC | Age: 64
End: 2024-07-08
Payer: MEDICARE

## 2024-07-08 DIAGNOSIS — R26.9 GAIT DISTURBANCE: Primary | ICD-10-CM

## 2024-07-08 DIAGNOSIS — M17.12 PRIMARY OSTEOARTHRITIS OF LEFT KNEE: ICD-10-CM

## 2024-07-08 PROCEDURE — 99999 PR PBB SHADOW E&M-EST. PATIENT-LVL III: CPT | Mod: PBBFAC,,, | Performed by: PHYSICIAN ASSISTANT

## 2024-07-08 RX ORDER — LIDOCAINE HYDROCHLORIDE 10 MG/ML
2 INJECTION INFILTRATION; PERINEURAL
Status: DISCONTINUED | OUTPATIENT
Start: 2024-07-08 | End: 2024-07-08 | Stop reason: HOSPADM

## 2024-07-08 RX ORDER — TRIAMCINOLONE ACETONIDE 40 MG/ML
40 INJECTION, SUSPENSION INTRA-ARTICULAR; INTRAMUSCULAR
Status: DISCONTINUED | OUTPATIENT
Start: 2024-07-08 | End: 2024-07-08 | Stop reason: HOSPADM

## 2024-07-08 RX ADMIN — LIDOCAINE HYDROCHLORIDE 2 ML: 10 INJECTION INFILTRATION; PERINEURAL at 02:07

## 2024-07-08 RX ADMIN — TRIAMCINOLONE ACETONIDE 40 MG: 40 INJECTION, SUSPENSION INTRA-ARTICULAR; INTRAMUSCULAR at 02:07

## 2024-07-08 NOTE — PROCEDURES
Large Joint Aspiration/Injection: L knee    Date/Time: 7/8/2024 2:30 PM    Performed by: Audrey Gunn PA-C  Authorized by: Audrey Gunn PA-C    Consent Done?:  Yes (Verbal)  Indications:  Pain  Timeout: prior to procedure the correct patient, procedure, and site was verified    Prep: patient was prepped and draped in usual sterile fashion    Local anesthetic:  Topical anesthetic    Details:  Needle Size:  22 G  Approach:  Anterolateral  Location:  Knee  Site:  L knee  Medications:  40 mg triamcinolone acetonide 40 mg/mL; 2 mL LIDOcaine HCL 10 mg/ml (1%) 10 mg/mL (1 %)  Patient tolerance:  Patient tolerated the procedure well with no immediate complications

## 2024-07-08 NOTE — PROGRESS NOTES
Patient ID: Ashley Romo is a 64 y.o. female.    Chief Complaint: Pain and Numbness of the Left Knee and Pain and Numbness of the Left Hip      HISTORY:  Ashley Romo is a 64 y.o. female who returns to me today for follow up of left leg pain s/p fall.    She is still having trouble with prolonged standing and more difficulty ambulating with her walker at home.  She states her knee feels weak and will give out.  She has pain in the medial and posterior knee with some radiation up to her hip  Her ankle and hands are feeling better.      PMH/PSH/FamHx/SocHx:    Unchanged from prior visit.    ROS:  Constitution: Negative for chills, fever and weakness.   Respiratory: Negative for cough and shortness of breath.   Musculoskeletal: Positive for left knee pain  Psychiatric/Behavioral: The patient is not nervous/anxious.       PHYSICAL EXAM:   Left knee  Skin intact  No warmth or effusion  TTP medial joint line  ROM 5-120  Stable to testing  No hip pain with passive ROM    IMAGING: previous x-rays left knee show moderate DJD    ASSESSMENT/PLAN:    Ashley was seen today for pain, numbness, pain and numbness.    Diagnoses and all orders for this visit:    Gait disturbance  -     Ambulatory referral/consult to Physical Medicine Rehab; Future  -     Ambulatory referral/consult to Physical Medicine Rehab    Primary osteoarthritis of left knee  -     Ambulatory referral/consult to Physical Medicine Rehab; Future  -     Ambulatory referral/consult to Physical Medicine Rehab  -     Large Joint Aspiration/Injection: L knee      - Left knee CSI performed today  - Recommend rest, ice as needed  - Referral to PMR- patient has been trying to get power chair for a while due to balance and gait disturbance but neuro has not been able to get one   - PT referral for left knee/leg to [facility]  - Follow up 2 months if needed

## 2024-10-04 ENCOUNTER — OFFICE VISIT (OUTPATIENT)
Dept: PHYSICAL MEDICINE AND REHAB | Facility: CLINIC | Age: 64
End: 2024-10-04
Payer: MEDICARE

## 2024-10-04 VITALS — BODY MASS INDEX: 43.98 KG/M2 | WEIGHT: 257.63 LBS | HEIGHT: 64 IN | OXYGEN SATURATION: 98 %

## 2024-10-04 DIAGNOSIS — R29.6 FREQUENT FALLS: ICD-10-CM

## 2024-10-04 DIAGNOSIS — G89.29 CHRONIC BILATERAL LOW BACK PAIN WITH RIGHT-SIDED SCIATICA: ICD-10-CM

## 2024-10-04 DIAGNOSIS — G81.94 LEFT HEMIPARESIS: ICD-10-CM

## 2024-10-04 DIAGNOSIS — R06.09 DOE (DYSPNEA ON EXERTION): ICD-10-CM

## 2024-10-04 DIAGNOSIS — M19.012 PRIMARY OSTEOARTHRITIS, LEFT SHOULDER: ICD-10-CM

## 2024-10-04 DIAGNOSIS — Z98.890 H/O CRANIOTOMY: ICD-10-CM

## 2024-10-04 DIAGNOSIS — E66.01 MORBID OBESITY WITH BMI OF 40.0-44.9, ADULT: ICD-10-CM

## 2024-10-04 DIAGNOSIS — G93.89 ENCEPHALOMALACIA: ICD-10-CM

## 2024-10-04 DIAGNOSIS — Z78.9 IMPAIRED MOBILITY AND ADLS: ICD-10-CM

## 2024-10-04 DIAGNOSIS — M54.41 CHRONIC BILATERAL LOW BACK PAIN WITH RIGHT-SIDED SCIATICA: ICD-10-CM

## 2024-10-04 DIAGNOSIS — Z74.09 IMPAIRED MOBILITY AND ADLS: ICD-10-CM

## 2024-10-04 DIAGNOSIS — M25.512 CHRONIC LEFT SHOULDER PAIN: ICD-10-CM

## 2024-10-04 DIAGNOSIS — M17.12 PRIMARY OSTEOARTHRITIS OF LEFT KNEE: ICD-10-CM

## 2024-10-04 DIAGNOSIS — G89.29 CHRONIC PAIN OF LEFT KNEE: ICD-10-CM

## 2024-10-04 DIAGNOSIS — M25.562 CHRONIC PAIN OF LEFT KNEE: ICD-10-CM

## 2024-10-04 DIAGNOSIS — R26.9 GAIT DISORDER: Primary | ICD-10-CM

## 2024-10-04 DIAGNOSIS — J45.909 ASTHMA, UNSPECIFIED ASTHMA SEVERITY, UNSPECIFIED WHETHER COMPLICATED, UNSPECIFIED WHETHER PERSISTENT: ICD-10-CM

## 2024-10-04 DIAGNOSIS — G89.29 CHRONIC LEFT SHOULDER PAIN: ICD-10-CM

## 2024-10-04 DIAGNOSIS — R27.0 ATAXIA: ICD-10-CM

## 2024-10-04 PROCEDURE — 99999 PR PBB SHADOW E&M-EST. PATIENT-LVL III: CPT | Mod: PBBFAC,,, | Performed by: PHYSICAL MEDICINE & REHABILITATION

## 2024-10-04 NOTE — PROGRESS NOTES
Subjective:       Patient ID: Ashley Romo is a 64 y.o. female.    Chief Complaint: No chief complaint on file.      HPI      Mrs. Romo is a 64-year-old female who is presenting to the Physical Medicine Clinic for evaluation for a power mobility device.  Her past medical history is significant for hypertension, DM type 2, hyperlipidemia, asthma, left renal mass status post resection, osteoarthritis, right cerebellopontine angle extra-axial meningioma status post craniotomy status post craniotomy and tumor resection in 04/2019, residual left hemiparesis and gait ataxia, OA of the left knee, OA of the left shoulder, chronic low back pain with right radiculopathy, morbid obesity MrsClarke weight of 116.8 kg and BMI of 44.2).    The patient lives with her  in a single-story home with no steps to enter.  She is independent with feeding herself.  She is independent with dressing.  She is independent with toileting but it takes her a long time.  She requires assistance for bathing.  She has a tub and has trouble getting in and out of the bathtub.  She ambulates with a rolling walker.  When her symptoms flare up she can go about 10 ft.  She is restricted by left lower extremity weakness, chronic low back pain with right radiculopathy (up to 7/8/2010), chronic left knee pain (7-8/10), impaired balance.  She reports history of falls about once every 2 weeks and with occasional head trauma.  She can not propel a manual wheelchair due to shortness of breath, left upper extremity weakness, chronic left shoulder pain (5-6/10).      Past Medical History:   Diagnosis Date    Arthritis     Asthma     Cancer of kidney 2016    Diabetes mellitus, type 2     Hypertension     Overdose of illicit drug     PAH (pulmonary artery hypertension) 2013        Review of patient's allergies indicates:   Allergen Reactions    Codeine Other (See Comments)     Abdominal pain^elevates blood pressure  Stabbing abdominal pain      Eggshell membrane  Rash     Rash     Ibuprofen Other (See Comments)     Raises blood pressure.    Naproxen sodium Other (See Comments)     ^elevates blood pressure    Metformin Diarrhea     Regular release metformin    Milk containing products (dairy) Other (See Comments)     aggravates asthma     Nsaids (non-steroidal anti-inflammatory drug) Other (See Comments)     Elevated BP        Review of Systems   Constitutional:  Positive for chills and fatigue. Negative for fever.   Eyes:  Negative for visual disturbance.   Respiratory:  Positive for shortness of breath.    Cardiovascular:  Positive for chest pain.   Gastrointestinal:  Negative for nausea and vomiting.   Genitourinary:  Negative for difficulty urinating.   Musculoskeletal:  Positive for arthralgias, back pain, gait problem and neck pain.   Neurological:  Positive for dizziness, weakness and headaches.   Psychiatric/Behavioral:  Negative for behavioral problems.              Objective:      Physical Exam  Vitals reviewed.   Constitutional:       General: She is not in acute distress.     Appearance: She is well-developed.      Comments: Coming to the clinic in a manual wheelchair propelled by      HENT:      Head: Normocephalic and atraumatic.   Eyes:      Extraocular Movements: Extraocular movements intact.   Cardiovascular:      Rate and Rhythm: Normal rate and regular rhythm.      Heart sounds: Normal heart sounds.   Pulmonary:      Effort: Pulmonary effort is normal.      Breath sounds: Normal breath sounds.   Abdominal:      Palpations: Abdomen is soft.   Musculoskeletal:      Cervical back: Normal range of motion.      Comments: BUE:  ROM:   RUE: full.   LUE: full.  Strength:    RUE: 5/5 at shoulder abduction, 5 elbow flexion, 5 elbow extension, 5 hand .   LUE: 4-/5 at shoulder abduction, 4 elbow flexion, 4 elbow extension, 4 hand .  Sensation to pinprick:   RUE: intact.   LUE: intact.  DTR:    RUE: +1 biceps, +1 triceps.   LUE:  +2 biceps, +2  triceps.      BLE:  ROM:   RLE: full.   LLE: full.  Knees:   RLE: +ve crepitus.    LLE: +ve crepitus.  Strength:    RLE: 5/5 at hip flexion, 5 knee extension, 5 ankle DF, 5 ankle PF.   LLE: 3/5 at hip flexion, 4 knee extension, 4 ankle DF,  4 ankle PF.  Sensation to pinprick:     RLE: intact.      LLE: intact.   DTR:     RLE: +1 knee, +1 ankle.    LLE: +1 knee, +1 ankle.  SLR (sitting):      RLE: -ve.      LLE: -ve.     -ve tenderness over lumbar spine.     Skin:     General: Skin is warm.   Neurological:      Mental Status: She is alert.   Psychiatric:         Behavior: Behavior normal.       Assessment:       1. Gait disorder    2. H/O craniotomy    3. Encephalomalacia    4. Ataxia    5. Primary osteoarthritis of left knee    6. Primary osteoarthritis, left shoulder    7. Frequent falls        Summary/Plan:               - The patient was seen today for mobility evaluation for a power mobility device due to significant impairment at home.  - The patient has multifactorial gait impairment.  - The patient is not able to ambulate safely at home.  - The patient is unable to use a walker functional distances due to [place] because of asthma, impaired balance due to meningioma, left hemiparesis, chronic low back pain with right radiculopathy due to DJD, chronic left knee pain due to OA.  - The patient is unable to use an optimally-configured manual wheelchair at home due to [place] because of asthma, left upper extremity weakness, chronic left shoulder pain due to OA, morbid obesity with higher energy requirement for wheelchair propulsion.  - She has history of frequent falls with occasional head trauma.  - The patient has intact cognition and should be able to use a power mobility device well at home.  - A prescription for a power wheelchair was generated.  - A scooter would not be appropriate due the patient's due to maneuverability restrictions at home.  A home assessment may be needed.  - This will allow the patient to go  safely to the kitchen, dining room or living room for feeding & socialization.  It should also help with energy conservation and reducing the risk of falls and injuries.  - A prescription for a Transfer Tub Bench was also generated to improve safety of transferring in/out of the bathtub and bathing.  - The patient is to return the Physical Medicine/Mobility clinic prn.        This was a 45 minute visit  (including review of records), 50% of which was spent educating the patient about the diagnosis and the treatment plan.    This note was partly generated with [employer] voice recognition software. I apologize for any possible typographical errors.

## 2024-11-19 ENCOUNTER — OFFICE VISIT (OUTPATIENT)
Dept: ORTHOPEDICS | Facility: CLINIC | Age: 64
End: 2024-11-19
Payer: MEDICARE

## 2024-11-19 DIAGNOSIS — M17.12 PRIMARY OSTEOARTHRITIS OF LEFT KNEE: Primary | ICD-10-CM

## 2024-11-19 PROCEDURE — 99499 UNLISTED E&M SERVICE: CPT | Mod: 95,,, | Performed by: PHYSICIAN ASSISTANT

## 2024-11-20 NOTE — PROGRESS NOTES
Telemedicine/Virtual Visit Documentation:     The patient location is: home     The chief complaint leading to consultation is: see HPI    VISIT TYPE X   Virtual visit with synchronous audio and video X   Telephone E/M service: conducted via a two way audio call      Each patient to whom he or she provides medical services by telemedicine is:  (1) informed of the relationship between the physician and patient and the respective role of any other health care provider with respect to management of the patient; and (2) notified that he or she may decline to receive medical services by telemedicine and may withdraw from such care at any time.    Subjective:   Chief complaint: Follow-up left knee pain    HPI:   Ashley Romo is a 64 y.o. female who presents today for follow-up.  Still has significant left knee pain.  She feels like this is causing her to fall.  Had some relief with injection in July.  Did receive motorized scooter.  She uses walker at home  Still doing PT- not sure if it is helping much    ROS:  Per HPI       Objective:   Exam:  General: No acute distress, well-appearing  Neurologic: Alert and oriented x3  Psychiatric: Appropriate mood and affect, cooperative    Imaging:  Previous xrays reviewed        Assessment:     1. Primary osteoarthritis of left knee         Plan:       - follow up in clinic for repeat injection  - ok to stop PT    No orders of the defined types were placed in this encounter.      Past Medical History:   Diagnosis Date    Arthritis     Asthma     Cancer of kidney 2016    Diabetes mellitus, type 2     Hypertension     Overdose of illicit drug     PAH (pulmonary artery hypertension) 2013       Past Surgical History:   Procedure Laterality Date    CHOLECYSTECTOMY  1986    HYSTERECTOMY  2011    NEPHRECTOMY Left 11/2017    PARTIAL HYSTERECTOMY  12/2006    SINUS SURGERY  1981       No family history on file.    Social History     Socioeconomic History    Marital status:    Tobacco  Use    Smoking status: Former    Smokeless tobacco: Never   Substance and Sexual Activity    Alcohol use: Yes     Comment: occationally     Social Drivers of Health     Financial Resource Strain: Low Risk  (7/29/2024)    Received from [facility]    Overall Financial Resource Strain (CARDIA)     Difficulty of Paying Living Expenses: Not very hard   Food Insecurity: No Food Insecurity (7/29/2024)    Received from [facility]    Hunger Vital Sign     Worried About Running Out of Food in the Last Year: Never true     Ran Out of Food in the Last Year: Never true   Transportation Needs: No Transportation Needs (7/29/2024)    Received from [facility]    PRAPARE - Transportation     Lack of Transportation (Medical): No     Lack of Transportation (Non-Medical): No   Physical Activity: Inactive (7/29/2024)    Received from [facility]    Exercise Vital Sign     Days of Exercise per Week: 0 days     Minutes of Exercise per Session: 0 min   Stress: No Stress Concern Present (7/29/2024)    Received from [facility]    [employer] - Occupational Stress Questionnaire     Feeling of Stress : Only a little   Housing Stability: Low Risk  (7/29/2024)    Received from [facility]    Housing Stability Vital Sign     Unable to Pay for Housing in the Last Year: No     Number of Places Lived in the Last Year: 1     Unstable Housing in the Last Year: No

## 2024-12-05 ENCOUNTER — TELEPHONE (OUTPATIENT)
Dept: ORTHOPEDICS | Facility: CLINIC | Age: 64
End: 2024-12-05
Payer: MEDICARE

## 2024-12-05 NOTE — TELEPHONE ENCOUNTER
Called and attempted to talk to pt but was unable to. I left a VM with my name, number, and clinic information.       ----- Message from Myesha sent at 12/5/2024  1:56 PM CST -----  Name of Who is Calling: VIGNESH SERRANO [[id number]]      What is the request in detail: requesting appointment for left werner injection. Requesting to give information on new symptoms on the elbow lowe fold  . States it feels like it stops circulation Symptom: Skin Lump  Outcome: Schedule an appointment to be seen within 24 hours.  Reason: Caller denied all higher acuity questions/ please advise          Can the clinic reply by [facility]: no       What Number to Call Back if not in [facility]: Telephone Information:  Mobile          [phone]

## 2024-12-19 ENCOUNTER — OFFICE VISIT (OUTPATIENT)
Dept: ORTHOPEDICS | Facility: CLINIC | Age: 64
End: 2024-12-19
Payer: MEDICARE

## 2024-12-19 ENCOUNTER — HOSPITAL ENCOUNTER (OUTPATIENT)
Dept: RADIOLOGY | Facility: HOSPITAL | Age: 64
Discharge: HOME | End: 2024-12-19
Attending: PHYSICIAN ASSISTANT
Payer: MEDICARE

## 2024-12-19 DIAGNOSIS — M25.521 RIGHT ELBOW PAIN: Primary | ICD-10-CM

## 2024-12-19 DIAGNOSIS — M17.12 PRIMARY OSTEOARTHRITIS OF LEFT KNEE: ICD-10-CM

## 2024-12-19 DIAGNOSIS — M25.521 RIGHT ELBOW PAIN: ICD-10-CM

## 2024-12-19 DIAGNOSIS — M79.89 MASS OF SOFT TISSUE OF ELBOW: ICD-10-CM

## 2024-12-19 PROCEDURE — 99999 PR PBB SHADOW E&M-EST. PATIENT-LVL III: CPT | Mod: PBBFAC,,, | Performed by: PHYSICIAN ASSISTANT

## 2024-12-19 PROCEDURE — 73080 X-RAY EXAM OF ELBOW: CPT | Mod: TC,RT

## 2024-12-19 PROCEDURE — 73080 X-RAY EXAM OF ELBOW: CPT | Mod: 26,RT,, | Performed by: RADIOLOGY

## 2024-12-19 RX ORDER — LIDOCAINE HYDROCHLORIDE 10 MG/ML
2 INJECTION, SOLUTION INFILTRATION; PERINEURAL
Status: DISCONTINUED | OUTPATIENT
Start: 2024-12-19 | End: 2024-12-19 | Stop reason: HOSPADM

## 2024-12-19 RX ORDER — TRIAMCINOLONE ACETONIDE 40 MG/ML
40 INJECTION, SUSPENSION INTRA-ARTICULAR; INTRAMUSCULAR
Status: DISCONTINUED | OUTPATIENT
Start: 2024-12-19 | End: 2024-12-19 | Stop reason: HOSPADM

## 2024-12-19 RX ADMIN — TRIAMCINOLONE ACETONIDE 40 MG: 40 INJECTION, SUSPENSION INTRA-ARTICULAR; INTRAMUSCULAR at 09:12

## 2024-12-19 RX ADMIN — LIDOCAINE HYDROCHLORIDE 2 ML: 10 INJECTION, SOLUTION INFILTRATION; PERINEURAL at 09:12

## 2024-12-19 NOTE — PROGRESS NOTES
Patient ID: Ashley Romo is a 64 y.o. female.    HISTORY:  Ashley Romo is a 64 y.o. female who returns to me today for follow up of left knee pain.    She is here today for left knee injection.  She also reports small lump to antecubital fossa that has been present for about 6 months.  She states it is not painful and has remained about the same size.  She does not recall an injury.  There is no redness or warmth.      PMH/PSH/FamHx/SocHx:    Unchanged from prior visit.    ROS:  Constitution: Negative for chills, fever and weakness.   Respiratory: Negative for cough and shortness of breath.   Musculoskeletal: Positive for left knee pain  Psychiatric/Behavioral: The patient is not nervous/anxious.       PHYSICAL EXAM:   Right elbow   Skin intact  No warmth or erythema   Small, mobile < 1 cm nontender nodule in antecubital fossa palpated  FROM    Left knee  Skin intact  No warmth or effusion  ROM 0-120  Stable to testing    IMAGING:  X-rays of the right elbow, personally reviewed by me, demonstrate well maintained joint space.  No fracture or dislocation.     ASSESSMENT/PLAN:    Ashley was seen today for pain and numbness.    Diagnoses and all orders for this visit:    Right elbow pain  -     X-Ray Elbow Complete 3 views Right; Future    Mass of soft tissue of elbow    Primary osteoarthritis of left knee  -     Large Joint Aspiration/Injection: L knee    - Left knee CSI performed today  - Right elbow- recommend continued monitoring/surveillance- possible small lipoma? If any worsening pain or increase in size recommend follow up

## 2024-12-19 NOTE — PROCEDURES
Large Joint Aspiration/Injection: L knee    Date/Time: 12/19/2024 9:00 AM    Performed by: Audrey Gunn PA-C  Authorized by: Audrey Gunn PA-C    Consent Done?:  Yes (Verbal)  Indications:  Pain  Timeout: prior to procedure the correct patient, procedure, and site was verified    Prep: patient was prepped and draped in usual sterile fashion    Local anesthetic:  Topical anesthetic    Details:  Needle Size:  22 G  Approach:  Anterolateral  Location:  Knee  Site:  L knee  Medications:  40 mg triamcinolone acetonide 40 mg/mL; 2 mL LIDOcaine HCL 10 mg/ml (1%) 10 mg/mL (1 %)  Patient tolerance:  Patient tolerated the procedure well with no immediate complications

## 2025-03-17 ENCOUNTER — TELEPHONE (OUTPATIENT)
Dept: GASTROENTEROLOGY | Facility: CLINIC | Age: 65
End: 2025-03-17
Payer: MEDICARE

## 2025-03-24 ENCOUNTER — TELEPHONE (OUTPATIENT)
Dept: GASTROENTEROLOGY | Facility: CLINIC | Age: 65
End: 2025-03-24
Payer: MEDICARE

## 2025-03-24 NOTE — TELEPHONE ENCOUNTER
----- Message from Ada sent at 3/24/2025 11:27 AM CDT -----  Regarding: Call back  Contact: [phone]  Type:  Patient Returning CallWho Called: PT Who Left Message for Patient: Nurse Does the patient know what this is regarding?: Yes Would the patient rather a call back or a response via [employer]? Call back Best Call Back Number: [phone] Information:

## 2025-03-24 NOTE — TELEPHONE ENCOUNTER
Clinic appt scheduled with pt on Wednesday, April 2, 2025 at 945am.  Pt given clinic address with instructions to check in on 1st floor MOB prior to coming to [address].  Pt go bring updated insurance card with id.  Pt repeated all information correctly.

## 2025-04-02 ENCOUNTER — OFFICE VISIT (OUTPATIENT)
Dept: GASTROENTEROLOGY | Facility: CLINIC | Age: 65
End: 2025-04-02
Payer: MEDICARE

## 2025-04-02 ENCOUNTER — LAB VISIT (OUTPATIENT)
Dept: LAB | Facility: HOSPITAL | Age: 65
End: 2025-04-02
Attending: INTERNAL MEDICINE
Payer: MEDICARE

## 2025-04-02 VITALS
WEIGHT: 257.5 LBS | HEIGHT: 64 IN | DIASTOLIC BLOOD PRESSURE: 77 MMHG | SYSTOLIC BLOOD PRESSURE: 146 MMHG | BODY MASS INDEX: 43.96 KG/M2

## 2025-04-02 DIAGNOSIS — D50.9 IRON DEFICIENCY ANEMIA, UNSPECIFIED IRON DEFICIENCY ANEMIA TYPE: ICD-10-CM

## 2025-04-02 DIAGNOSIS — D50.9 IRON DEFICIENCY ANEMIA, UNSPECIFIED IRON DEFICIENCY ANEMIA TYPE: Primary | ICD-10-CM

## 2025-04-02 PROCEDURE — 3008F BODY MASS INDEX DOCD: CPT | Mod: CPTII,S$GLB,, | Performed by: INTERNAL MEDICINE

## 2025-04-02 PROCEDURE — 36415 COLL VENOUS BLD VENIPUNCTURE: CPT

## 2025-04-02 PROCEDURE — 86340 INTRINSIC FACTOR ANTIBODY: CPT

## 2025-04-02 PROCEDURE — 1125F AMNT PAIN NOTED PAIN PRSNT: CPT | Mod: CPTII,S$GLB,, | Performed by: INTERNAL MEDICINE

## 2025-04-02 PROCEDURE — 1101F PT FALLS ASSESS-DOCD LE1/YR: CPT | Mod: CPTII,S$GLB,, | Performed by: INTERNAL MEDICINE

## 2025-04-02 PROCEDURE — 4010F ACE/ARB THERAPY RXD/TAKEN: CPT | Mod: CPTII,S$GLB,, | Performed by: INTERNAL MEDICINE

## 2025-04-02 PROCEDURE — 99204 OFFICE O/P NEW MOD 45 MIN: CPT | Mod: S$GLB,,, | Performed by: INTERNAL MEDICINE

## 2025-04-02 PROCEDURE — 86256 FLUORESCENT ANTIBODY TITER: CPT

## 2025-04-02 PROCEDURE — 1159F MED LIST DOCD IN RCRD: CPT | Mod: CPTII,S$GLB,, | Performed by: INTERNAL MEDICINE

## 2025-04-02 PROCEDURE — 3288F FALL RISK ASSESSMENT DOCD: CPT | Mod: CPTII,S$GLB,, | Performed by: INTERNAL MEDICINE

## 2025-04-02 PROCEDURE — 3078F DIAST BP <80 MM HG: CPT | Mod: CPTII,S$GLB,, | Performed by: INTERNAL MEDICINE

## 2025-04-02 PROCEDURE — 3077F SYST BP >= 140 MM HG: CPT | Mod: CPTII,S$GLB,, | Performed by: INTERNAL MEDICINE

## 2025-04-02 PROCEDURE — 99999 PR PBB SHADOW E&M-EST. PATIENT-LVL III: CPT | Mod: PBBFAC,,, | Performed by: INTERNAL MEDICINE

## 2025-04-02 RX ORDER — METOCLOPRAMIDE 10 MG/1
10 TABLET ORAL 4 TIMES DAILY
Qty: 1 TABLET | Refills: 0 | Status: SHIPPED | OUTPATIENT
Start: 2025-04-02

## 2025-04-02 NOTE — PROGRESS NOTES
"[facility]    HPI 65 y.o. female with history of anemia for the last 10 years. Referred to arrange VCE. Celiac panel has been negative. Currently taking OTC iron supplemental Garden of life. She has not needed IV iron or a transfusion. She is [employer].     Past Medical History:  History of renal cell CA  Pulmonary hypertension   History of cerebellar meningioma   HTN   DM     Physical Examination  BP (!) 146/77 (BP Location: Right forearm, Patient Position: Sitting)   Ht 5' 4" (1.626 m)   Wt 116.8 kg (257 lb 8 oz)   BMI 44.20 kg/m²   General appearance: wheel chair dependent  Lungs: no increased work of breathing  Heart: 2+ peripheral pulses      Labs:  Ferritin 17 (2/2025)  Hgb 11.8  MCV 83    Endoscopy:    EGD 1/27/25: nl esophagus. Gastric erythema (mild chronic gastritis). Nl duodenum (-).   Colon 8/15/24: Nl colon. tics. Rpt in 5 yrs.   EGD 2/9/21: nl esophagus. Gastric erythema (mild chr gastritsi). Nl duodenum.   Colon 10/24/19: 4 mm cecal polyp (-). Two 4-5 mm sigmoid colon polyps (SSr). Sigmoid colon tics. Large IHs. Rpt in 5 yrs.    Previous medical records reviewed including external documents     Assessment: 65 y.o. female with chronic iron deficiency >5 years in duration associated with chronic gastritis on gastric biopsy. 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 However, chronic GI blood loss from angioectasias or other bleeding sources is also possible. This patient is a vegan but consumes foods with high iron content so dietary insufficiency seems unlikely.   (Chronic illness with recent exacerbation and uncertain prognosis)     Plan:  - VCE on 4/15  - check anti intrinsic factor and anti parietal cell antibodies today   - If antibodies negative with a normal video capsule, will recheck iron studies to see if ERLIN resolves with supplementation. If it does not, will pursue push enteroscopy with gastric mapping and gastric pH for AMAG  -Due for " colonoscopy surveillance in 2029    Bruce Nolen MD   [address] [place] [address], [address]  [place] [address]   [phone]

## 2025-04-02 NOTE — PATIENT INSTRUCTIONS
Small Bowel Capsule Endoscopy Study PREP INSTRUCTIONS    [facility] [place] [id number]  [phone]    PATIENT NAME:_______________PROCEDURE DAY/TIME:__Tuesday, April 15, 2025______  CLEAR LIQUID DIET after lunch the day before the procedure  Clear Liquid Diet means any liquid from the list below that is not red or purple in color:  Gatorade, Silverio-Aid, Lemonade (Yellow ONLY)-Gatorade is the preferred liquid  Tea (no milk or dairy)  Carbonated beverages (soft drink), regular or diet  Apple juice, white grape juice, white cranberry juice  Jell-O (orange, lemon, or lime flavors ONLY)  Clear, fat-free, beef or chicken broth  Bouillon, clear consommé  Snowball, Popsicles (NOT red or purple)  * No Solid Food or Alcohol   IBOWEL PREP INSTRUCTIONS THE DAY BEFORE THE EXAM:  Drink only clear liquids (see the above diet) all day. Gatorade is the best liquid.   Drink an extra 8 ounces of clear liquid every hour from 11am to 5pm.        2. Nothing to drink after midnight.  THE DAY OF THE EXAM:  Report to registration at 7 am.  Capsule to be given at 8am  Return to Endoscopy 8 hours following capsule ingestion.

## 2025-04-04 LAB
ANNOTATION COMMENT IMP: NORMAL
IF BLOCK AB SER QL: NEGATIVE
PCA AB TITR SER IF: NORMAL {TITER}

## 2025-04-08 ENCOUNTER — TELEPHONE (OUTPATIENT)
Dept: GASTROENTEROLOGY | Facility: CLINIC | Age: 65
End: 2025-04-08
Payer: MEDICARE

## 2025-04-08 NOTE — TELEPHONE ENCOUNTER
----- Message from Pako sent at 4/8/2025  2:26 PM CDT -----  Type:  Procedure Who Called:pt Does the patient know what this is regarding?:procedure time Would the patient rather a call back or a response via [employer]? Call Best Call Back Number:  [phone] Information: caller stated she rides transportation and they need to know ahead of time

## 2025-04-08 NOTE — TELEPHONE ENCOUNTER
Pt requesting gi arrival time of vce scheduled on 4/15/25.    Patient given  arrival time @ 730am on Tuesday, April 15, 2025 to [facility] 1st floor Admit.        Prep instructions reviewed: the day before the procedure.Pt must be completely NPO at midnight.               Medications: Do not take Insulin or oral diabetic medications the day of the procedure.  Take as prescribed: heart, seizure and blood pressure medication in the morning with a sip of water (less than an ounce).  Take any breathing medications and bring inhalers to hospital with you Leave all valuables and jewelry at home.      Wear comfortable clothes to procedure to change into hospital gown You cannot drive for 24 hours after your procedure because you will receive sedation for your procedure to make you comfortable.  A ride must be provided at discharge.      Pt denies taking glp-1 meds.

## 2025-04-15 ENCOUNTER — HOSPITAL ENCOUNTER (OUTPATIENT)
Facility: HOSPITAL | Age: 65
Discharge: HOME | End: 2025-04-15
Attending: INTERNAL MEDICINE | Admitting: INTERNAL MEDICINE
Payer: MEDICARE

## 2025-04-15 PROCEDURE — 91110 GI TRC IMG INTRAL ESOPH-ILE: CPT | Mod: TC | Performed by: INTERNAL MEDICINE

## 2025-04-15 NOTE — PLAN OF CARE
Pt swallowed capsule without difficulty. Instructions given on capsule care and return. Pt verbalizes understanding

## 2025-04-15 NOTE — H&P
"[facility]    CC: Anemia    HPI 65 y.o. female with history of anemia for the last 10 years. Referred to arrange VCE. Celiac panel has been negative. Currently taking OTC iron supplemental "Garden of life." She has not needed IV iron or a transfusion. She is [employer].     Past Medical History  PAH  Arthritis  Asthma  Renal cancer    Physical Examination  There were no vitals taken for this visit.  General appearance: alert, cooperative, no distress  Lungs: no increased work of breathing  Heart: regular rate and rhythm without rub  Abdomen: soft, non-tender    Assessment:   65 y.o. female with chronic iron deficiency >5 years in duration associated with chronic gastritis on gastric biopsy. 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 However, chronic GI blood loss from angioectasias or other bleeding sources is also possible. This patient is a vegan but consumes foods with high iron content so dietary insufficiency seems unlikely.     Plan:  Proceed with VCE today  If VCE normal, will recheck iron studies. If no improvement will pursue push enteroscopy with gastric mapping and pH    Bruce Nolen MD   [address] [place] [address], [address]   [place] [address] [phone]   "